# Patient Record
Sex: FEMALE | Race: ASIAN | NOT HISPANIC OR LATINO
[De-identification: names, ages, dates, MRNs, and addresses within clinical notes are randomized per-mention and may not be internally consistent; named-entity substitution may affect disease eponyms.]

---

## 2018-11-08 ENCOUNTER — APPOINTMENT (OUTPATIENT)
Dept: ENDOCRINOLOGY | Facility: CLINIC | Age: 58
End: 2018-11-08

## 2020-01-16 ENCOUNTER — APPOINTMENT (OUTPATIENT)
Dept: ENDOCRINOLOGY | Facility: CLINIC | Age: 60
End: 2020-01-16
Payer: MEDICAID

## 2020-01-16 VITALS
WEIGHT: 184 LBS | DIASTOLIC BLOOD PRESSURE: 76 MMHG | HEIGHT: 67.72 IN | OXYGEN SATURATION: 97 % | SYSTOLIC BLOOD PRESSURE: 145 MMHG | HEART RATE: 71 BPM | BODY MASS INDEX: 28.21 KG/M2

## 2020-01-16 DIAGNOSIS — L65.9 NONSCARRING HAIR LOSS, UNSPECIFIED: ICD-10-CM

## 2020-01-16 PROCEDURE — 99204 OFFICE O/P NEW MOD 45 MIN: CPT | Mod: 25

## 2020-01-16 PROCEDURE — 36415 COLL VENOUS BLD VENIPUNCTURE: CPT

## 2020-01-16 RX ORDER — AMLODIPINE AND OLMESARTAN MEDOXOMIL 5; 40 MG/1; MG/1
5-40 TABLET ORAL
Refills: 0 | Status: ACTIVE | COMMUNITY

## 2020-01-16 NOTE — ASSESSMENT
[FreeTextEntry1] : Patient being evaluated for a suppressed TSH. On exam she does have an enlarged thyroid right greater than left so she will do a thyroid ultrasound. Laboratory test repeated today to check her thyroid hormone levels as well as thyroid antibody levels. Given she has a suppressed TSH we are checking her pituitary hormone levels FSH LH and prolactin. We will try to retrieve records for the surgery and she had for a pelvic mass at the time she had ascites.We did discuss the hair loss could be related to abnormal thyroid function. \par f/u one month \par Labs drawn in officer

## 2020-01-16 NOTE — PAST MEDICAL HISTORY
[Postmenopausal] : The patient is postmenopausal [Menarche Age ____] : age at menarche was [unfilled] [Menopause Age____] : age at menopause was [unfilled] [Regular Cycle Intervals] : have been regular [Total Preg ___] : G[unfilled] [Full Term ___] : Full Term: [unfilled]

## 2020-01-16 NOTE — PHYSICAL EXAM
[Alert] : alert [Well Developed] : well developed [No Acute Distress] : no acute distress [Well Nourished] : well nourished [No Proptosis] : no proptosis [EOMI] : extra ocular movement intact [Normal Sclera/Conjunctiva] : normal sclera/conjunctiva [Visual Fields Grossly Intact] : visual fields grossly intact [No Lid Lag] : no lid lag [Normal Oropharynx] : the oropharynx was normal [No Thyroid Nodules] : there were no palpable thyroid nodules [No Respiratory Distress] : no respiratory distress [No Accessory Muscle Use] : no accessory muscle use [Normal S1, S2] : normal S1 and S2 [Clear to Auscultation] : lungs were clear to auscultation bilaterally [Normal Rate] : heart rate was normal  [Pedal Pulses Normal] : the pedal pulses are present [No Edema] : there was no peripheral edema [Regular Rhythm] : with a regular rhythm [Soft] : abdomen soft [Normal Bowel Sounds] : normal bowel sounds [Not Tender] : non-tender [Post Cervical Nodes] : posterior cervical nodes [Not Distended] : not distended [Normal] : normal and non tender [Anterior Cervical Nodes] : anterior cervical nodes [Axillary Nodes] : axillary nodes [No Spinal Tenderness] : no spinal tenderness [Spine Straight] : spine straight [No Stigmata of Cushings Syndrome] : no stigmata of cushings syndrome [Normal Strength/Tone] : muscle strength and tone were normal [Normal Gait] : normal gait [No Rash] : no rash [Normal Reflexes] : deep tendon reflexes were 2+ and symmetric [No Tremors] : no tremors [Oriented x3] : oriented to person, place, and time [de-identified] : thyroid enlarged R>L 2-3 X normal, non teneder [Acanthosis Nigricans] : no acanthosis nigricans

## 2020-01-16 NOTE — CONSULT LETTER
[( Thank you for referring [unfilled] for consultation for _____ )] : Thank you for referring [unfilled] for consultation for [unfilled] [Dear  ___] : Dear  [unfilled], [Consult Closing:] : Thank you very much for allowing me to participate in the care of this patient.  If you have any questions, please do not hesitate to contact me. [Please see my note below.] : Please see my note below. [FreeTextEntry3] : Fide Stoddard MD\par  [Sincerely,] : Sincerely,

## 2020-01-16 NOTE — REVIEW OF SYSTEMS
[Recent Weight Gain (___ Lbs)] : recent [unfilled] ~Ulb weight gain [Myalgia] : myalgia  [As Noted in HPI] : as noted in HPI [Hair Loss] : hair loss [All other systems negative] : All other systems negative [Negative] : Heme/Lymph [Palpitations] : no palpitations [Insomnia] : no insomnia [de-identified] : hair loss for a year [FreeTextEntry3] : glasses reading

## 2020-01-16 NOTE — HISTORY OF PRESENT ILLNESS
[FreeTextEntry1] : Patient is a 59-year-old woman with at least 2 laboratory test showing a suppressed TSH. She does note hair loss but denies using Biotin. She's  never been told that she has an enlarged thyroid and never had an ultrasound. She has seen an endocrinologist in the past for the hair loss. At the time she was told she does have an abnormality of her thyroid hormone level. She denies symptoms compatible with hyperthyroidism such as weight loss, palpitations, or tremors. She denies anxiety or insomnia. She has been experiencing hair loss and some heat intolerance. She went through a relatively early menopause at age 47. At one time she did have ascites and had a pelvic mass that was removed but she does not know the pathology was never treated for cancer. Her sister has a history of having had a thyroidectomy; did not have thyroid cancer.

## 2020-01-22 ENCOUNTER — FORM ENCOUNTER (OUTPATIENT)
Age: 60
End: 2020-01-22

## 2020-01-23 ENCOUNTER — APPOINTMENT (OUTPATIENT)
Dept: ULTRASOUND IMAGING | Facility: CLINIC | Age: 60
End: 2020-01-23
Payer: MEDICAID

## 2020-01-23 ENCOUNTER — OUTPATIENT (OUTPATIENT)
Dept: OUTPATIENT SERVICES | Facility: HOSPITAL | Age: 60
LOS: 1 days | End: 2020-01-23
Payer: MEDICAID

## 2020-01-23 DIAGNOSIS — R18.8 OTHER ASCITES: Chronic | ICD-10-CM

## 2020-01-23 DIAGNOSIS — E04.9 NONTOXIC GOITER, UNSPECIFIED: ICD-10-CM

## 2020-01-23 DIAGNOSIS — D17.20 BENIGN LIPOMATOUS NEOPLASM OF SKIN AND SUBCUTANEOUS TISSUE OF UNSPECIFIED LIMB: Chronic | ICD-10-CM

## 2020-01-23 PROCEDURE — 76536 US EXAM OF HEAD AND NECK: CPT

## 2020-01-23 PROCEDURE — 76536 US EXAM OF HEAD AND NECK: CPT | Mod: 26

## 2020-03-03 ENCOUNTER — APPOINTMENT (OUTPATIENT)
Dept: ENDOCRINOLOGY | Facility: CLINIC | Age: 60
End: 2020-03-03
Payer: MEDICAID

## 2020-03-03 VITALS
WEIGHT: 185 LBS | HEART RATE: 79 BPM | DIASTOLIC BLOOD PRESSURE: 82 MMHG | SYSTOLIC BLOOD PRESSURE: 148 MMHG | OXYGEN SATURATION: 97 % | BODY MASS INDEX: 28.36 KG/M2 | HEIGHT: 67.72 IN

## 2020-03-03 DIAGNOSIS — Z87.898 PERSONAL HISTORY OF OTHER SPECIFIED CONDITIONS: ICD-10-CM

## 2020-03-03 LAB
ALBUMIN SERPL ELPH-MCNC: 4.3 G/DL
ALP BLD-CCNC: 103 U/L
ALT SERPL-CCNC: 27 U/L
ANION GAP SERPL CALC-SCNC: 12 MMOL/L
AST SERPL-CCNC: 23 U/L
BILIRUB SERPL-MCNC: 0.3 MG/DL
BUN SERPL-MCNC: 10 MG/DL
CALCIUM SERPL-MCNC: 9.6 MG/DL
CHLORIDE SERPL-SCNC: 105 MMOL/L
CO2 SERPL-SCNC: 22 MMOL/L
CREAT SERPL-MCNC: 0.6 MG/DL
DHEA SERPL-MCNC: 276 NG/DL
FSH SERPL-MCNC: 48.3 IU/L
GLUCOSE SERPL-MCNC: 106 MG/DL
LH SERPL-ACNC: 34.2 IU/L
POTASSIUM SERPL-SCNC: 4.2 MMOL/L
PROLACTIN SERPL-MCNC: 5.2 NG/ML
PROT SERPL-MCNC: 7.5 G/DL
SAVE SPECIMEN: NORMAL
SODIUM SERPL-SCNC: 139 MMOL/L
T3 SERPL-MCNC: 144 NG/DL
T4 FREE SERPL-MCNC: 1 NG/DL
T4 SERPL-MCNC: 5.1 UG/DL
TESTOST BND SERPL-MCNC: 2 PG/ML
TESTOST SERPL-MCNC: 30.1 NG/DL
THYROGLOB AB SERPL-ACNC: <20 IU/ML
THYROPEROXIDASE AB SERPL IA-ACNC: 20.6 IU/ML
TSH RECEPTOR AB: <1.1 IU/L
TSH SERPL-ACNC: <0.01 UIU/ML
TSI ACT/NOR SER: <0.1 IU/L

## 2020-03-03 PROCEDURE — 99214 OFFICE O/P EST MOD 30 MIN: CPT

## 2020-03-03 NOTE — PHYSICAL EXAM
[Alert] : alert [No Acute Distress] : no acute distress [Well Nourished] : well nourished [Well Developed] : well developed [EOMI] : extra ocular movement intact [Normal Sclera/Conjunctiva] : normal sclera/conjunctiva [No Proptosis] : no proptosis [Visual Fields Grossly Intact] : visual fields grossly intact [No Lid Lag] : no lid lag [Normal Oropharynx] : the oropharynx was normal [No Thyroid Nodules] : there were no palpable thyroid nodules [No Respiratory Distress] : no respiratory distress [No Accessory Muscle Use] : no accessory muscle use [Clear to Auscultation] : lungs were clear to auscultation bilaterally [Normal Rate] : heart rate was normal  [Normal S1, S2] : normal S1 and S2 [Regular Rhythm] : with a regular rhythm [Pedal Pulses Normal] : the pedal pulses are present [No Edema] : there was no peripheral edema [Normal Bowel Sounds] : normal bowel sounds [Not Tender] : non-tender [Soft] : abdomen soft [Not Distended] : not distended [Post Cervical Nodes] : posterior cervical nodes [Axillary Nodes] : axillary nodes [Anterior Cervical Nodes] : anterior cervical nodes [Normal] : normal and non tender [No Spinal Tenderness] : no spinal tenderness [No Stigmata of Cushings Syndrome] : no stigmata of cushings syndrome [Spine Straight] : spine straight [Normal Strength/Tone] : muscle strength and tone were normal [Normal Gait] : normal gait [No Rash] : no rash [Acanthosis Nigricans] : no acanthosis nigricans [Normal Reflexes] : deep tendon reflexes were 2+ and symmetric [No Tremors] : no tremors [Oriented x3] : oriented to person, place, and time [de-identified] : thyroid enlarged R>L 2-3 X normal, non tender

## 2020-03-03 NOTE — ASSESSMENT
[FreeTextEntry1] : Patient being evaluated for a suppressed TSH. On exam she does have an enlarged thyroid right greater than left  A  thyroid ultrasound showed bilateral  2.5 cm benign looking thyroid nodules. . Laboratory tests repeated still showed a fully suppressed TSH  with normal T4 and T3  and normal  pituitary hormone levels FSH LH c/w menopause and prolactin. We will try to retrieve records for the surgery and she had for a pelvic mass at the time she had ascites.We did discuss the hair loss could be related to abnormal thyroid function. \par Labs c/w autonomous MNG, will do a thyroid uptake and scan. Will reach out to last GYN she saw and make a f/u appt. \par f/u one month \par

## 2020-06-24 ENCOUNTER — OUTPATIENT (OUTPATIENT)
Dept: OUTPATIENT SERVICES | Facility: HOSPITAL | Age: 60
LOS: 1 days | End: 2020-06-24

## 2020-06-24 ENCOUNTER — APPOINTMENT (OUTPATIENT)
Dept: NUCLEAR MEDICINE | Facility: CLINIC | Age: 60
End: 2020-06-24
Payer: MEDICAID

## 2020-06-24 ENCOUNTER — RESULT REVIEW (OUTPATIENT)
Age: 60
End: 2020-06-24

## 2020-06-24 DIAGNOSIS — R18.8 OTHER ASCITES: Chronic | ICD-10-CM

## 2020-06-24 DIAGNOSIS — D17.20 BENIGN LIPOMATOUS NEOPLASM OF SKIN AND SUBCUTANEOUS TISSUE OF UNSPECIFIED LIMB: Chronic | ICD-10-CM

## 2020-06-24 DIAGNOSIS — R79.89 OTHER SPECIFIED ABNORMAL FINDINGS OF BLOOD CHEMISTRY: ICD-10-CM

## 2020-06-25 ENCOUNTER — APPOINTMENT (OUTPATIENT)
Dept: NUCLEAR MEDICINE | Facility: CLINIC | Age: 60
End: 2020-06-25

## 2020-06-25 ENCOUNTER — RESULT REVIEW (OUTPATIENT)
Age: 60
End: 2020-06-25

## 2020-06-25 PROCEDURE — 78014 THYROID IMAGING W/BLOOD FLOW: CPT | Mod: 26

## 2020-07-08 ENCOUNTER — APPOINTMENT (OUTPATIENT)
Dept: ENDOCRINOLOGY | Facility: CLINIC | Age: 60
End: 2020-07-08
Payer: MEDICAID

## 2020-07-08 VITALS
OXYGEN SATURATION: 99 % | HEIGHT: 67 IN | DIASTOLIC BLOOD PRESSURE: 90 MMHG | BODY MASS INDEX: 28.11 KG/M2 | WEIGHT: 179.13 LBS | SYSTOLIC BLOOD PRESSURE: 166 MMHG | HEART RATE: 86 BPM

## 2020-07-08 VITALS — DIASTOLIC BLOOD PRESSURE: 70 MMHG | SYSTOLIC BLOOD PRESSURE: 140 MMHG

## 2020-07-08 PROCEDURE — 36415 COLL VENOUS BLD VENIPUNCTURE: CPT

## 2020-07-08 PROCEDURE — 99213 OFFICE O/P EST LOW 20 MIN: CPT | Mod: 25

## 2020-07-08 NOTE — PHYSICAL EXAM
[Well Nourished] : well nourished [Alert] : alert [No Acute Distress] : no acute distress [Normal Sclera/Conjunctiva] : normal sclera/conjunctiva [Well Developed] : well developed [EOMI] : extra ocular movement intact [No Proptosis] : no proptosis [Normal Oropharynx] : the oropharynx was normal [No Respiratory Distress] : no respiratory distress [Clear to Auscultation] : lungs were clear to auscultation bilaterally [No Accessory Muscle Use] : no accessory muscle use [Normal Rate] : heart rate was normal [Normal S1, S2] : normal S1 and S2 [Regular Rhythm] : with a regular rhythm [Pedal Pulses Normal] : the pedal pulses are present [No Edema] : no peripheral edema [Normal Bowel Sounds] : normal bowel sounds [Not Distended] : not distended [Soft] : abdomen soft [Not Tender] : non-tender [No Spinal Tenderness] : no spinal tenderness [Normal Gait] : normal gait [Spine Straight] : spine straight [No Rash] : no rash [Normal Strength/Tone] : muscle strength and tone were normal [Acanthosis Nigricans] : no acanthosis nigricans [Oriented x3] : oriented to person, place, and time [Normal Reflexes] : deep tendon reflexes were 2+ and symmetric [No Tremors] : no tremors [de-identified] : thyroid full 60 G

## 2020-07-08 NOTE — HISTORY OF PRESENT ILLNESS
[FreeTextEntry1] : Patient is a 59-year-old woman with at least 2 laboratory test showing a suppressed TSH. She does note hair loss but denies using Biotin. She's  never been told that she has an enlarged thyroid and never had an ultrasound. She has seen an endocrinologist in the past for the hair loss. At the time she was told she does have an abnormality of her thyroid hormone level. She denies symptoms compatible with hyperthyroidism such as weight loss, palpitations, or tremors. She denies anxiety or insomnia. She has been experiencing hair loss and some heat intolerance. She went through a relatively early menopause at age 47. At one time she did have ascites and had a pelvic mass that was removed but she does not know the pathology was never treated for cancer. Her sister has a history of having had a thyroidectomy; did not have thyroid cancer.\par Her labs show nl T4 and T3 but suppressed TSH. sono showed multiple nodules and thyroid uptake slightly high 37 % with heterogenous uptake.

## 2020-07-08 NOTE — ASSESSMENT
[FreeTextEntry1] : Patient being evaluated for a suppressed TSH. On exam she does have an enlarged thyroid right greater than left  A  thyroid ultrasound showed bilateral  2.5 cm benign looking thyroid nodules. . Laboratory tests repeated still showed a fully suppressed TSH  with normal T4 and T3  and normal  pituitary hormone levels FSH LH c/w menopause and prolactin. Will repeat TFTs today, sono and thyroid uptake and scan c/w Toxic MNG.Consider trial MTM. \par Still needs f/u with GYN. She understands.\par f/u 2 months

## 2020-08-24 LAB
T3 SERPL-MCNC: 157 NG/DL
T4 FREE SERPL-MCNC: 0.9 NG/DL
T4 SERPL-MCNC: 5 UG/DL
TSH SERPL-ACNC: <0.01 UIU/ML

## 2020-09-10 ENCOUNTER — APPOINTMENT (OUTPATIENT)
Dept: ENDOCRINOLOGY | Facility: CLINIC | Age: 60
End: 2020-09-10

## 2020-09-23 ENCOUNTER — APPOINTMENT (OUTPATIENT)
Dept: OBGYN | Facility: HOSPITAL | Age: 60
End: 2020-09-23

## 2020-10-23 ENCOUNTER — APPOINTMENT (OUTPATIENT)
Dept: ENDOCRINOLOGY | Facility: CLINIC | Age: 60
End: 2020-10-23
Payer: MEDICAID

## 2020-10-23 VITALS
HEIGHT: 67 IN | BODY MASS INDEX: 27.15 KG/M2 | SYSTOLIC BLOOD PRESSURE: 114 MMHG | DIASTOLIC BLOOD PRESSURE: 75 MMHG | OXYGEN SATURATION: 94 % | TEMPERATURE: 96.6 F | WEIGHT: 173 LBS | HEART RATE: 98 BPM

## 2020-10-23 DIAGNOSIS — E04.9 NONTOXIC GOITER, UNSPECIFIED: ICD-10-CM

## 2020-10-23 DIAGNOSIS — Z78.0 ASYMPTOMATIC MENOPAUSAL STATE: ICD-10-CM

## 2020-10-23 DIAGNOSIS — R73.03 PREDIABETES.: ICD-10-CM

## 2020-10-23 DIAGNOSIS — R79.89 OTHER SPECIFIED ABNORMAL FINDINGS OF BLOOD CHEMISTRY: ICD-10-CM

## 2020-10-23 LAB
GLUCOSE BLDC GLUCOMTR-MCNC: 96
HBA1C MFR BLD HPLC: 5.9

## 2020-10-23 PROCEDURE — 99214 OFFICE O/P EST MOD 30 MIN: CPT | Mod: 25

## 2020-10-23 PROCEDURE — 83036 HEMOGLOBIN GLYCOSYLATED A1C: CPT | Mod: QW

## 2020-10-23 PROCEDURE — 99072 ADDL SUPL MATRL&STAF TM PHE: CPT

## 2020-10-23 PROCEDURE — 82962 GLUCOSE BLOOD TEST: CPT

## 2020-10-23 RX ORDER — CHLORHEXIDINE GLUCONATE 4 %
LIQUID (ML) TOPICAL
Refills: 0 | Status: ACTIVE | COMMUNITY

## 2020-10-26 NOTE — ASSESSMENT
[FreeTextEntry1] : Patient being evaluated for a suppressed TSH. A thyroid ultrasound showed bilateral 2.5 cm benign looking thyroid nodules. Laboratory tests repeated still showed a fully suppressed TSH with normal T4 and T3 and normal pituitary hormone levels FSH LH c/w menopause and prolactin. Will repeat TFTs today, sono and thyroid uptake and scan c/w Toxic MNG.Consider trial MTM. \par Still needs f/u with GYN. She understands.\par In terms of IGT, we discussed the importance of following a carbohydrate balanced diet. We also discussed appropriate alternative food choices. \par f/u 4-6 weeks with Dr. Stoddard  [Diabetes Foot Care] : diabetes foot care [Long Term Vascular Complications] : long term vascular complications of diabetes [Carbohydrate Consistent Diet] : carbohydrate consistent diet [Importance of Diet and Exercise] : importance of diet and exercise to improve glycemic control, achieve weight loss and improve cardiovascular health [Exercise/Effect on Glucose] : exercise/effect on glucose [Retinopathy Screening] : Patient was referred to ophthalmology for retinopathy screening

## 2020-10-26 NOTE — PHYSICAL EXAM
[Alert] : alert [Well Nourished] : well nourished [No Acute Distress] : no acute distress [Well Developed] : well developed [Normal Sclera/Conjunctiva] : normal sclera/conjunctiva [EOMI] : extra ocular movement intact [No Proptosis] : no proptosis [No Respiratory Distress] : no respiratory distress [No Accessory Muscle Use] : no accessory muscle use [Clear to Auscultation] : lungs were clear to auscultation bilaterally [Normal S1, S2] : normal S1 and S2 [Normal Rate] : heart rate was normal [Regular Rhythm] : with a regular rhythm [No Edema] : no peripheral edema [Pedal Pulses Normal] : the pedal pulses are present [Normal Bowel Sounds] : normal bowel sounds [Not Tender] : non-tender [Not Distended] : not distended [Soft] : abdomen soft [No Spinal Tenderness] : no spinal tenderness [Spine Straight] : spine straight [Normal Gait] : normal gait [Normal Strength/Tone] : muscle strength and tone were normal [No Rash] : no rash [Normal Reflexes] : deep tendon reflexes were 2+ and symmetric [No Tremors] : no tremors [Oriented x3] : oriented to person, place, and time [Acanthosis Nigricans] : no acanthosis nigricans [de-identified] : thyroid full approx 60 g

## 2020-10-26 NOTE — HISTORY OF PRESENT ILLNESS
[FreeTextEntry1] : Patient is a 60-year-old woman with a suppressed TSH. She denies symptoms compatible with hyperthyroidism such as weight loss, palpitations, or tremors. She denies anxiety or insomnia.She did recently lose some weight, but has been trying to make changes to her diet, due to being told that her BS was elevated. A1C today is 5.9. She went through a relatively early menopause at age 47. At one time she did have ascites and had a pelvic mass that was removed but she does not know the pathology was never treated for cancer. Her sister has a history of having had a thyroidectomy; did not have thyroid cancer.\par Her labs show nl T4 and T3 but suppressed TSH. sono showed multiple nodules and thyroid uptake slightly high 37 % with heterogenous uptake. \par She was recently hospitalized due to coughing up blood. She reports a mass was found in her lungs. She is reluctant to discuss this. \par

## 2020-10-28 LAB
ALBUMIN SERPL ELPH-MCNC: 3.9 G/DL
ALP BLD-CCNC: 80 U/L
ALT SERPL-CCNC: 25 U/L
ANION GAP SERPL CALC-SCNC: 13 MMOL/L
AST SERPL-CCNC: 21 U/L
BILIRUB SERPL-MCNC: 0.4 MG/DL
BUN SERPL-MCNC: 17 MG/DL
CALCIUM SERPL-MCNC: 9.1 MG/DL
CHLORIDE SERPL-SCNC: 106 MMOL/L
CO2 SERPL-SCNC: 24 MMOL/L
CREAT SERPL-MCNC: 0.67 MG/DL
GLUCOSE SERPL-MCNC: 90 MG/DL
POTASSIUM SERPL-SCNC: 4.4 MMOL/L
PROT SERPL-MCNC: 6 G/DL
SAVE SPECIMEN: NORMAL
SODIUM SERPL-SCNC: 142 MMOL/L
T3 SERPL-MCNC: 166 NG/DL
T4 FREE SERPL-MCNC: 1.5 NG/DL
T4 SERPL-MCNC: 6.6 UG/DL
TSH SERPL-ACNC: <0.01 UIU/ML

## 2020-10-30 DIAGNOSIS — E05.90 THYROTOXICOSIS, UNSPECIFIED W/OUT THYROTOXIC CRISIS OR STORM: ICD-10-CM

## 2020-10-30 RX ORDER — METHIMAZOLE 10 MG/1
10 TABLET ORAL
Qty: 30 | Refills: 1 | Status: ACTIVE | COMMUNITY
Start: 2020-10-30 | End: 1900-01-01

## 2020-12-17 ENCOUNTER — APPOINTMENT (OUTPATIENT)
Dept: ENDOCRINOLOGY | Facility: CLINIC | Age: 60
End: 2020-12-17